# Patient Record
Sex: FEMALE | Race: OTHER | Employment: OTHER | URBAN - METROPOLITAN AREA
[De-identification: names, ages, dates, MRNs, and addresses within clinical notes are randomized per-mention and may not be internally consistent; named-entity substitution may affect disease eponyms.]

---

## 2017-01-16 PROBLEM — Z01.419 WELL FEMALE EXAM WITH ROUTINE GYNECOLOGICAL EXAM: Status: ACTIVE | Noted: 2017-01-16

## 2017-01-16 PROBLEM — Z13.29 SCREENING FOR ENDOCRINE DISORDER: Status: ACTIVE | Noted: 2017-01-16

## 2017-01-16 PROBLEM — Z00.00 MEDICARE ANNUAL WELLNESS VISIT, SUBSEQUENT: Status: ACTIVE | Noted: 2017-01-16

## 2017-01-16 PROBLEM — Z12.11 COLON CANCER SCREENING: Status: ACTIVE | Noted: 2017-01-16

## 2017-01-17 PROCEDURE — 81001 URINALYSIS AUTO W/SCOPE: CPT | Performed by: INTERNAL MEDICINE

## 2017-03-01 PROBLEM — Z00.00 MEDICARE ANNUAL WELLNESS VISIT, SUBSEQUENT: Status: RESOLVED | Noted: 2017-01-16 | Resolved: 2017-03-01

## 2017-03-01 PROBLEM — Z01.419 WELL FEMALE EXAM WITH ROUTINE GYNECOLOGICAL EXAM: Status: RESOLVED | Noted: 2017-01-16 | Resolved: 2017-03-01

## 2017-03-01 PROBLEM — Z13.29 SCREENING FOR ENDOCRINE DISORDER: Status: RESOLVED | Noted: 2017-01-16 | Resolved: 2017-03-01

## 2017-03-01 PROBLEM — Z12.11 COLON CANCER SCREENING: Status: RESOLVED | Noted: 2017-01-16 | Resolved: 2017-03-01

## 2018-02-13 PROCEDURE — 81001 URINALYSIS AUTO W/SCOPE: CPT | Performed by: INTERNAL MEDICINE

## 2018-02-13 PROCEDURE — 82746 ASSAY OF FOLIC ACID SERUM: CPT | Performed by: INTERNAL MEDICINE

## 2018-02-13 PROCEDURE — 82607 VITAMIN B-12: CPT | Performed by: INTERNAL MEDICINE

## 2018-03-12 PROBLEM — E11.9 DIET-CONTROLLED DIABETES MELLITUS (HCC): Status: RESOLVED | Noted: 2018-03-12 | Resolved: 2018-03-12

## 2018-03-12 PROBLEM — Z00.00 PREVENTATIVE HEALTH CARE: Status: ACTIVE | Noted: 2018-03-12

## 2018-03-12 PROBLEM — E11.9 DIET-CONTROLLED DIABETES MELLITUS (HCC): Status: ACTIVE | Noted: 2018-03-12

## 2018-05-02 PROCEDURE — 88175 CYTOPATH C/V AUTO FLUID REDO: CPT | Performed by: OBSTETRICS & GYNECOLOGY

## 2018-05-25 PROBLEM — I72.9 ANEURYSM (HCC): Status: ACTIVE | Noted: 2018-05-25

## 2019-02-20 PROBLEM — I67.1 INTERNAL CAROTID ANEURYSM: Status: ACTIVE | Noted: 2018-05-25

## 2019-04-01 PROBLEM — I67.1 CEREBRAL ANEURYSM: Status: ACTIVE | Noted: 2018-07-24

## 2019-04-01 PROBLEM — R51.9 NONINTRACTABLE EPISODIC HEADACHE: Status: ACTIVE | Noted: 2018-07-24

## 2021-10-01 ENCOUNTER — OFFICE VISIT (OUTPATIENT)
Dept: FAMILY MEDICINE CLINIC | Facility: CLINIC | Age: 76
End: 2021-10-01
Payer: COMMERCIAL

## 2021-10-01 VITALS
WEIGHT: 187 LBS | DIASTOLIC BLOOD PRESSURE: 64 MMHG | TEMPERATURE: 99 F | BODY MASS INDEX: 33.55 KG/M2 | OXYGEN SATURATION: 97 % | HEIGHT: 62.5 IN | SYSTOLIC BLOOD PRESSURE: 120 MMHG | HEART RATE: 60 BPM

## 2021-10-01 DIAGNOSIS — Z78.0 POSTMENOPAUSAL: ICD-10-CM

## 2021-10-01 DIAGNOSIS — D69.6 THROMBOCYTOPENIA (HCC): ICD-10-CM

## 2021-10-01 DIAGNOSIS — E66.09 CLASS 1 OBESITY DUE TO EXCESS CALORIES WITH SERIOUS COMORBIDITY AND BODY MASS INDEX (BMI) OF 33.0 TO 33.9 IN ADULT: ICD-10-CM

## 2021-10-01 DIAGNOSIS — I10 ESSENTIAL HYPERTENSION WITH GOAL BLOOD PRESSURE LESS THAN 140/90: Primary | ICD-10-CM

## 2021-10-01 DIAGNOSIS — E78.2 MIXED HYPERLIPIDEMIA: ICD-10-CM

## 2021-10-01 DIAGNOSIS — R60.0 PEDAL EDEMA: ICD-10-CM

## 2021-10-01 PROBLEM — Z12.11 COLON CANCER SCREENING: Status: RESOLVED | Noted: 2017-01-16 | Resolved: 2021-10-01

## 2021-10-01 PROCEDURE — 3074F SYST BP LT 130 MM HG: CPT | Performed by: FAMILY MEDICINE

## 2021-10-01 PROCEDURE — 3078F DIAST BP <80 MM HG: CPT | Performed by: FAMILY MEDICINE

## 2021-10-01 PROCEDURE — 99204 OFFICE O/P NEW MOD 45 MIN: CPT | Performed by: FAMILY MEDICINE

## 2021-10-01 PROCEDURE — 3008F BODY MASS INDEX DOCD: CPT | Performed by: FAMILY MEDICINE

## 2021-10-01 RX ORDER — TRIAMTERENE AND HYDROCHLOROTHIAZIDE 37.5; 25 MG/1; MG/1
1 CAPSULE ORAL DAILY PRN
Qty: 90 CAPSULE | Refills: 0 | Status: SHIPPED | OUTPATIENT
Start: 2021-10-01 | End: 2022-01-09

## 2021-10-01 RX ORDER — PRAVASTATIN SODIUM 10 MG
10 TABLET ORAL NIGHTLY
Qty: 90 TABLET | Refills: 1 | Status: SHIPPED | OUTPATIENT
Start: 2021-10-01

## 2021-10-01 RX ORDER — AMLODIPINE BESYLATE 5 MG/1
5 TABLET ORAL DAILY
Qty: 90 TABLET | Refills: 1 | Status: SHIPPED | OUTPATIENT
Start: 2021-10-01

## 2021-10-01 RX ORDER — ATENOLOL 25 MG/1
25 TABLET ORAL NIGHTLY
Qty: 90 TABLET | Refills: 1 | Status: SHIPPED | OUTPATIENT
Start: 2021-10-01

## 2021-10-01 NOTE — PROGRESS NOTES
Jason Agee is a 68year old female. HPI:     New patient. HTN:    Stable. Severity is mild, patient is on 2 agents to control her hypertension. Pt has been taking medications as instructed, no medication side effects.    Diet: Follows a low- status: Never Smoker      Smokeless tobacco: Never Used    Vaping Use      Vaping Use: Never used    Alcohol use: No      Alcohol/week: 0.0 standard drinks    Drug use: No        Family History   Problem Relation Age of Onset   • Arthritis Other    • Mirela hypertension with goal blood pressure less than 140/90  (primary encounter diagnosis)  Pedal edema  Mixed hyperlipidemia  Thrombocytopenia (hcc)  Class 1 obesity due to excess calories with serious comorbidity and body mass index (bmi) of 33.0 to 33.9 in a Thrombocytopenia (HCC)  Asymptomatic. Recheck CBC.    - CBC WITH DIFFERENTIAL WITH PLATELET    5. Class 1 obesity due to excess calories with serious comorbidity and body mass index (BMI) of 33.0 to 33.9 in adult  Weight loss recommended.   Recommend healt

## 2021-10-02 PROBLEM — Z78.0 POSTMENOPAUSAL: Status: ACTIVE | Noted: 2021-10-02

## 2021-10-02 PROBLEM — E66.09 CLASS 1 OBESITY DUE TO EXCESS CALORIES WITH SERIOUS COMORBIDITY AND BODY MASS INDEX (BMI) OF 33.0 TO 33.9 IN ADULT: Status: ACTIVE | Noted: 2021-10-02

## 2021-10-02 PROBLEM — Z00.00 PREVENTATIVE HEALTH CARE: Status: RESOLVED | Noted: 2018-03-12 | Resolved: 2021-10-02

## 2021-10-02 PROBLEM — R60.0 PEDAL EDEMA: Status: ACTIVE | Noted: 2021-10-02

## 2021-10-08 ENCOUNTER — HOSPITAL ENCOUNTER (OUTPATIENT)
Dept: BONE DENSITY | Age: 76
Discharge: HOME | End: 2021-10-08
Attending: FAMILY MEDICINE
Payer: MEDICARE

## 2021-10-08 DIAGNOSIS — Z78.0 POSTMENOPAUSAL: ICD-10-CM

## 2021-10-08 PROCEDURE — 77080 DXA BONE DENSITY AXIAL: CPT | Performed by: FAMILY MEDICINE

## 2021-11-04 ENCOUNTER — OFFICE VISIT (OUTPATIENT)
Dept: FAMILY MEDICINE CLINIC | Facility: CLINIC | Age: 76
End: 2021-11-04
Payer: COMMERCIAL

## 2021-11-04 VITALS
SYSTOLIC BLOOD PRESSURE: 134 MMHG | TEMPERATURE: 98 F | HEART RATE: 64 BPM | WEIGHT: 178 LBS | HEIGHT: 62.25 IN | DIASTOLIC BLOOD PRESSURE: 60 MMHG | BODY MASS INDEX: 32.34 KG/M2 | OXYGEN SATURATION: 97 %

## 2021-11-04 DIAGNOSIS — Z00.00 MEDICARE ANNUAL WELLNESS VISIT, SUBSEQUENT: Primary | ICD-10-CM

## 2021-11-04 DIAGNOSIS — I87.2 CHRONIC VENOUS INSUFFICIENCY: ICD-10-CM

## 2021-11-04 DIAGNOSIS — E78.2 MIXED HYPERLIPIDEMIA: ICD-10-CM

## 2021-11-04 DIAGNOSIS — I67.1 CEREBRAL ANEURYSM: ICD-10-CM

## 2021-11-04 DIAGNOSIS — Z76.89 ENCOUNTER FOR NEW MEDICATION PRESCRIPTION: ICD-10-CM

## 2021-11-04 DIAGNOSIS — R60.0 PEDAL EDEMA: ICD-10-CM

## 2021-11-04 DIAGNOSIS — E78.5 DYSLIPIDEMIA: ICD-10-CM

## 2021-11-04 DIAGNOSIS — E66.09 CLASS 1 OBESITY DUE TO EXCESS CALORIES WITH SERIOUS COMORBIDITY AND BODY MASS INDEX (BMI) OF 32.0 TO 32.9 IN ADULT: ICD-10-CM

## 2021-11-04 DIAGNOSIS — I10 ESSENTIAL HYPERTENSION WITH GOAL BLOOD PRESSURE LESS THAN 140/90: ICD-10-CM

## 2021-11-04 DIAGNOSIS — I72.9 ANEURYSM (HCC): ICD-10-CM

## 2021-11-04 DIAGNOSIS — M75.21 BICEPS TENDINITIS OF RIGHT UPPER EXTREMITY: ICD-10-CM

## 2021-11-04 DIAGNOSIS — I67.1 INTERNAL CAROTID ANEURYSM: ICD-10-CM

## 2021-11-04 DIAGNOSIS — Z12.31 ENCOUNTER FOR SCREENING MAMMOGRAM FOR MALIGNANT NEOPLASM OF BREAST: ICD-10-CM

## 2021-11-04 DIAGNOSIS — E78.6 LOW HDL (UNDER 40): ICD-10-CM

## 2021-11-04 PROBLEM — R51.9 NONINTRACTABLE EPISODIC HEADACHE: Status: RESOLVED | Noted: 2018-07-24 | Resolved: 2021-11-04

## 2021-11-04 PROBLEM — Z78.0 POSTMENOPAUSAL: Status: RESOLVED | Noted: 2021-10-02 | Resolved: 2021-11-04

## 2021-11-04 PROCEDURE — 3078F DIAST BP <80 MM HG: CPT | Performed by: FAMILY MEDICINE

## 2021-11-04 PROCEDURE — 96160 PT-FOCUSED HLTH RISK ASSMT: CPT | Performed by: FAMILY MEDICINE

## 2021-11-04 PROCEDURE — 3075F SYST BP GE 130 - 139MM HG: CPT | Performed by: FAMILY MEDICINE

## 2021-11-04 PROCEDURE — 99397 PER PM REEVAL EST PAT 65+ YR: CPT | Performed by: FAMILY MEDICINE

## 2021-11-04 PROCEDURE — 3008F BODY MASS INDEX DOCD: CPT | Performed by: FAMILY MEDICINE

## 2021-11-04 PROCEDURE — G0439 PPPS, SUBSEQ VISIT: HCPCS | Performed by: FAMILY MEDICINE

## 2021-11-04 RX ORDER — FENOFIBRATE 67 MG/1
67 CAPSULE ORAL
Qty: 90 CAPSULE | Refills: 0 | Status: SHIPPED | OUTPATIENT
Start: 2021-11-04

## 2021-11-04 NOTE — PROGRESS NOTES
HPI:   Geoffrey Reeves is a 68year old female who presents for a MA (Medicare Advantage) Supervisit (Once per calendar year).     Has been under increased stress at home, patient's niece who lives with her was having mental health problems and is currentl lb (84.8 kg)  05/25/18 : 169 lb (76.7 kg)     Last Cholesterol Labs:   Lab Results   Component Value Date    CHOLEST 170 10/04/2021    HDL 38 (L) 10/04/2021    LDL 96 10/04/2021    TRIG 237 (H) 10/04/2021          Last Chemistry Labs:   Lab Results   [place] Heart Disease in her father and mother. SOCIAL HISTORY:   She  reports that she has never smoked. She has never used smokeless tobacco. She reports that she does not drink alcohol and does not use drugs.      REVIEW OF SYSTEMS:   GENERAL: feels well overa bicipital groove, no tenderness to palpation of coracoid process.    Lungs:   Clear to auscultation bilaterally, respirations unlabored   Heart:  Regular rate and rhythm, S1 and S2 normal, no murmur   Abdomen:   Soft, non-tender, no masses, no organomegaly Surgical Neuroradiology  Vascular Neurology\"          ASSESSMENT AND OTHER RELEVANT CHRONIC CONDITIONS:   Josey Carreno is a 68year old female who presents for a Medicare Assessment.      Medicare annual wellness visit, subsequent  (primary encounter myrtle upper extremity  Patient provided handout on exercises for biceps tendinitis. Patient to call to let me know if symptoms do not resolve, would have patient do physical therapy next. (Aneurysms)  7. Aneurysm ([address]  8. Cerebral aneurysm  9.  Internal swenson fenofibrate micronized 67 MG Oral Cap; Take 1 capsule (67 mg total) by mouth daily with breakfast.  -     LIPID PANEL; Future    Low HDL (under 40)  -     LIPID PANEL; Future    Dyslipidemia  -     fenofibrate micronized 67 MG Oral Cap;  Take 1 capsule (67 insurer. Please check with your insurance carrier before scheduling to verify coverage.    PREVENTATIVE SERVICES FREQUENCY &  COVERAGE DETAILS LAST COMPLETION DATE   Diabetes Screening    Fasting Blood Sugar /  Glucose    One screening every 12 months if risk -  No recommendations at this time    Chlamydia Annually if high risk -  No recommendations at this time   Screening Mammogram    Mammogram     Recommend annually for all female patients aged 36 and older    One baseline mammogram covered for patients

## 2021-11-04 NOTE — PATIENT INSTRUCTIONS
-If the shoulder pain does not get better, please call to let Dr. Kelsi Johns know so we can order physical therapy for you.                           [facility] [place]'s SCREENING SCHEDULE   Tests on this list are recommended by your physici with risk of osteoporosis or estrogen deficiency.     Covered yearly for long-term glucocorticoid medication use (Steroids) Last Dexa Scan:    XR DEXA BONE DENSITOMETRY (CPT=77080) 10/08/2021      No recommendations at this time   Pap and Pelvic    Pap   Co Directives    [email or web address] pdf  An information packet, including necessary form from the [employer] website. http://www. idph.state. [place]/public/books/advin.htm  A link to the [place] diabetes o colesterol alto, es posible que se tenga que hacer análisis de kimberlee cada yannick meses. Mely esfuerzos por disminuir mely niveles de colesterol darán resultados lentos avelino seguros; tenga paciencia y cumpla con landers tratamiento.  [place] sobre mely va es: ________________  · Los triglicéridos son un tipo de grasa que el organismo utiliza para almacenar energía. El exceso de triglicéridos puede aumentar el riesgo de tener lucía enfermedad cardíaca.  Los niveles de triglicéridos deben mantenerse por debajo d médica acerca de la metas del tratamiento. Asegúrese de entender por qué son importantes estas metas, con base en landers historial de joanne y ilan antecedentes familiares de enfermedad cardíaca o colesterol alto.   Planifique hacerse revisiones del colesterol re las argueta de las arterias. Wisdom aumenta landers riesgo de padecer enfermedad cardíaca y accidente cerebrovascular. · Las lipoproteínas HDL (de cesar densidad) se conocen haydee “colesterol king”.  Adeline revestimiento de la proteína recoge el exceso de colesterol buenas maneras de [employer]. · Comience con un nivel de actividad que le resulte cómodo. Vaya aumentando cada [employer] y el ritmo de la [place].   · Vaya aumentando hasta hacer entre 30 y 36 minutos de ejercicio físico de moderado a inten riesgo de lucía enfermedad cardiovascular. Dieta saludable  Landers proveedor de [facility] dará información sobre los cambios que debe hacer en landers Nestor Michelle landers situación.  Landers proveedor puede recomendarle que rufus a un dietista registrado para que le ejercicios aeróbicos en el agua  · Hacer senderismo  · Bailar  · Practicar artes marciales  · Jugar al tenis  · Montar en bicicleta común o fija (estacionaria)  Control del peso  Si está excedido de peso o tiene obesidad, landers proveedor de atención médica co indicaciones que le haya dado. Recuerde lo siguiente:   · Informe a landers proveedor de [facility] de todos los medicamentos que esté tomando, incluyendo hierbas medicinales y vitaminas.   · Avise a landers proveedor de [facility] médica si tiene [facility] laith de alto riesgo, hable con landers proveedor de [employer] [facility] médica acerca de la metas del Suha Fleeting.  Asegúrese de entender por qué son importantes estas metas, con base en landers historial de joanne y ilan antecedentes familiares de enfermedad cardíaca o colester insaturadas. Jessica todas las grasas tienen un alto contenido de calorías, por lo que incluso las grasas no saturadas deben consumirse en pequeñas cantidades.   Limite el consumo de grasas saturadas  Las grasas saturadas son de origen animal. También pueden p jocelyn, que normalmente proviene de [address], es un ejemplo de [place] tipo de [place]. Otro ejemplo es [address] grasas trans suelen encontrarse en los alimentos envasados.  Aubree la lista de ingredientes para shelli si aparece la palabra [id number] [address]. Todos los derechos reservados. Esta información no pretende sustituir la atención médica profesional. Sólo landers médico puede diagnosticar y tratar un problema de joanne.

## 2021-11-26 ENCOUNTER — HOSPITAL ENCOUNTER (OUTPATIENT)
Dept: MAMMOGRAPHY | Age: 76
Discharge: HOME | End: 2021-11-26
Attending: FAMILY MEDICINE
Payer: MEDICARE

## 2021-11-26 DIAGNOSIS — Z12.31 ENCOUNTER FOR SCREENING MAMMOGRAM FOR MALIGNANT NEOPLASM OF BREAST: ICD-10-CM

## 2021-11-26 PROCEDURE — 77063 BREAST TOMOSYNTHESIS BI: CPT | Performed by: FAMILY MEDICINE

## 2021-11-26 PROCEDURE — 77067 SCR MAMMO BI INCL CAD: CPT | Performed by: FAMILY MEDICINE

## 2022-01-06 ENCOUNTER — TELEPHONE (OUTPATIENT)
Dept: FAMILY MEDICINE CLINIC | Facility: CLINIC | Age: 77
End: 2022-01-06

## 2022-01-06 NOTE — TELEPHONE ENCOUNTER
----- Message from Tracy Schirmer, DO sent at 1/1/2022  6:40 PM CST -----  Please call patient and speak directly with the patient:Please advise patient to schedule additional mammography views which have been ordered.

## 2022-01-06 NOTE — TELEPHONE ENCOUNTER
Attempted to contact patient several times. Message left with son to have patient call the office. Letter mailed to address listed to call the office. Letter also printed in [facility].

## 2022-01-09 DIAGNOSIS — R60.0 PEDAL EDEMA: ICD-10-CM

## 2022-01-09 RX ORDER — TRIAMTERENE AND HYDROCHLOROTHIAZIDE 37.5; 25 MG/1; MG/1
1 CAPSULE ORAL DAILY PRN
Qty: 90 CAPSULE | Refills: 0 | Status: SHIPPED | OUTPATIENT
Start: 2022-01-09

## 2022-01-10 NOTE — TELEPHONE ENCOUNTER
Refill Passed Protocol:     Pt requesting refill of TRIAMTERENE-HCTZ    Refill was approved and sent to pharmacy:   Last Office Visit with Provider: 11/4/21    No future appointments.

## 2022-01-12 PROBLEM — Z76.89 ENCOUNTER FOR NEW MEDICATION PRESCRIPTION: Status: RESOLVED | Noted: 2021-11-04 | Resolved: 2022-01-12

## 2022-01-12 PROBLEM — Z00.00 MEDICARE ANNUAL WELLNESS VISIT, SUBSEQUENT: Status: ACTIVE | Noted: 2022-01-12

## 2022-01-12 PROBLEM — E78.5 DYSLIPIDEMIA: Status: RESOLVED | Noted: 2021-11-04 | Resolved: 2022-01-12

## 2022-01-12 PROBLEM — I72.9 ANEURYSM (HCC): Status: RESOLVED | Noted: 2021-11-04 | Resolved: 2022-01-12

## 2022-01-12 PROBLEM — R60.0 PEDAL EDEMA: Status: RESOLVED | Noted: 2021-10-02 | Resolved: 2022-01-12

## 2022-01-12 PROBLEM — E78.6 LOW HDL (UNDER 40): Status: RESOLVED | Noted: 2021-11-04 | Resolved: 2022-01-12

## 2022-01-12 PROBLEM — M75.21 BICEPS TENDINITIS OF RIGHT UPPER EXTREMITY: Status: RESOLVED | Noted: 2021-11-04 | Resolved: 2022-01-12

## 2022-04-07 RX ORDER — AMLODIPINE BESYLATE 5 MG/1
TABLET ORAL
Qty: 90 TABLET | Refills: 0 | Status: SHIPPED | OUTPATIENT
Start: 2022-04-07

## 2022-04-07 RX ORDER — ATENOLOL 25 MG/1
TABLET ORAL
Qty: 90 TABLET | Refills: 0 | Status: SHIPPED | OUTPATIENT
Start: 2022-04-07

## 2022-04-16 RX ORDER — TRIAMTERENE AND HYDROCHLOROTHIAZIDE 37.5; 25 MG/1; MG/1
1 CAPSULE ORAL DAILY PRN
Qty: 90 CAPSULE | Refills: 0 | Status: CANCELLED | OUTPATIENT
Start: 2022-04-16

## 2022-04-16 RX ORDER — PRAVASTATIN SODIUM 10 MG
TABLET ORAL
Qty: 90 TABLET | Refills: 1 | Status: CANCELLED | OUTPATIENT
Start: 2022-04-16

## 2022-04-18 RX ORDER — PRAVASTATIN SODIUM 10 MG
TABLET ORAL
Qty: 90 TABLET | Refills: 1 | OUTPATIENT
Start: 2022-04-18

## 2022-04-18 RX ORDER — TRIAMTERENE AND HYDROCHLOROTHIAZIDE 37.5; 25 MG/1; MG/1
1 CAPSULE ORAL DAILY PRN
Qty: 90 CAPSULE | Refills: 0 | OUTPATIENT
Start: 2022-04-18

## 2022-04-18 NOTE — TELEPHONE ENCOUNTER
Please call patient/pharmacy. Chart reviewed, apparently patient is now seeing another PCP, Dr. Bernardino Alonzo. Refill for medications declined.

## 2024-11-25 PROBLEM — I67.1 CEREBRAL ANEURYSM (CMD): Status: ACTIVE | Noted: 2018-07-24

## 2024-11-25 PROBLEM — E66.9 OBESITY DUE TO ENERGY IMBALANCE: Status: ACTIVE | Noted: 2021-11-04

## 2024-11-25 PROBLEM — I11.9 BENIGN HYPERTENSIVE HEART DISEASE WITHOUT HEART FAILURE: Status: ACTIVE | Noted: 2024-11-25

## 2024-11-25 PROBLEM — M25.561 ACUTE PAIN OF RIGHT KNEE: Status: ACTIVE | Noted: 2022-04-20

## 2024-11-25 PROBLEM — L27.2 FOOD ALLERGIC SKIN REACTION: Status: ACTIVE | Noted: 2024-11-25

## 2024-12-10 PROBLEM — R51.9 NONINTRACTABLE EPISODIC HEADACHE: Status: ACTIVE | Noted: 2018-07-24

## 2024-12-10 PROBLEM — S20.211A CONTUSION OF RIGHT CHEST WALL: Status: RESOLVED | Noted: 2022-08-29 | Resolved: 2024-12-10

## 2024-12-10 PROBLEM — S20.211A CONTUSION OF RIGHT CHEST WALL: Status: ACTIVE | Noted: 2022-08-29

## 2024-12-10 PROBLEM — N18.31 STAGE 3A CHRONIC KIDNEY DISEASE  (CMD): Status: ACTIVE | Noted: 2024-12-10

## 2024-12-10 PROBLEM — M25.561 ACUTE PAIN OF RIGHT KNEE: Status: RESOLVED | Noted: 2022-04-20 | Resolved: 2024-12-10

## 2024-12-27 PROBLEM — Z00.00 MEDICARE ANNUAL WELLNESS VISIT, SUBSEQUENT: Status: ACTIVE | Noted: 2024-12-27

## 2025-04-03 PROBLEM — G43.009 MIGRAINE WITHOUT AURA AND WITHOUT STATUS MIGRAINOSUS, NOT INTRACTABLE: Status: ACTIVE | Noted: 2025-04-03

## 2025-04-19 ENCOUNTER — HOSPITAL ENCOUNTER (OUTPATIENT)
Age: 80
End: 2025-04-19

## 2025-04-19 DIAGNOSIS — D49.6 BRAIN TUMOR  (CMD): ICD-10-CM

## 2025-04-19 PROCEDURE — 70551 MRI BRAIN STEM W/O DYE: CPT

## 2025-07-29 ENCOUNTER — TELEPHONE (OUTPATIENT)
Age: 80
End: 2025-07-29

## (undated) DIAGNOSIS — I10 ESSENTIAL HYPERTENSION WITH GOAL BLOOD PRESSURE LESS THAN 140/90: ICD-10-CM

## (undated) DIAGNOSIS — R60.0 PEDAL EDEMA: ICD-10-CM

## (undated) DIAGNOSIS — E78.2 MIXED HYPERLIPIDEMIA: ICD-10-CM

## (undated) NOTE — LETTER
01/06/22 January 6, 7990        [address] [facility] [address]      Dear Denise Tarango: We have been unable to reach you by phone.   In an effort to provide quality patient care we are reaching out to you to contact the office at your earliest

## (undated) NOTE — LETTER
[birth year]        Melchor [address],    Nuestros registros indican que tiene análisis clínicos pendientes y/o pruebas que se ordenaron en landers nombre que no se madrigal completado.     Para brindarle la mejor at